# Patient Record
Sex: FEMALE | Race: BLACK OR AFRICAN AMERICAN | NOT HISPANIC OR LATINO | URBAN - METROPOLITAN AREA
[De-identification: names, ages, dates, MRNs, and addresses within clinical notes are randomized per-mention and may not be internally consistent; named-entity substitution may affect disease eponyms.]

---

## 2024-03-29 ENCOUNTER — EMERGENCY (EMERGENCY)
Facility: HOSPITAL | Age: 54
LOS: 1 days | Discharge: HOME | End: 2024-03-29
Attending: STUDENT IN AN ORGANIZED HEALTH CARE EDUCATION/TRAINING PROGRAM | Admitting: STUDENT IN AN ORGANIZED HEALTH CARE EDUCATION/TRAINING PROGRAM
Payer: COMMERCIAL

## 2024-03-29 VITALS
TEMPERATURE: 98 F | DIASTOLIC BLOOD PRESSURE: 91 MMHG | WEIGHT: 195.11 LBS | RESPIRATION RATE: 16 BRPM | OXYGEN SATURATION: 96 % | HEART RATE: 100 BPM | SYSTOLIC BLOOD PRESSURE: 149 MMHG

## 2024-03-29 DIAGNOSIS — M54.6 PAIN IN THORACIC SPINE: ICD-10-CM

## 2024-03-29 DIAGNOSIS — E11.9 TYPE 2 DIABETES MELLITUS WITHOUT COMPLICATIONS: ICD-10-CM

## 2024-03-29 DIAGNOSIS — M54.50 LOW BACK PAIN, UNSPECIFIED: ICD-10-CM

## 2024-03-29 DIAGNOSIS — M79.662 PAIN IN LEFT LOWER LEG: ICD-10-CM
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
## 2024-03-29 DIAGNOSIS — G89.11 ACUTE PAIN DUE TO TRAUMA: ICD-10-CM

## 2024-03-29 DIAGNOSIS — M79.661 PAIN IN RIGHT LOWER LEG: ICD-10-CM
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
## 2024-03-29 PROCEDURE — 72100 X-RAY EXAM L-S SPINE 2/3 VWS: CPT

## 2024-03-29 PROCEDURE — 99284 EMERGENCY DEPT VISIT MOD MDM: CPT | Mod: 25

## 2024-03-29 PROCEDURE — 73523 X-RAY EXAM HIPS BI 5/> VIEWS: CPT | Mod: 26

## 2024-03-29 PROCEDURE — 72070 X-RAY EXAM THORAC SPINE 2VWS: CPT | Mod: 26

## 2024-03-29 PROCEDURE — 72100 X-RAY EXAM L-S SPINE 2/3 VWS: CPT | Mod: 26

## 2024-03-29 PROCEDURE — 99284 EMERGENCY DEPT VISIT MOD MDM: CPT

## 2024-03-29 PROCEDURE — 73523 X-RAY EXAM HIPS BI 5/> VIEWS: CPT

## 2024-03-29 PROCEDURE — 72070 X-RAY EXAM THORAC SPINE 2VWS: CPT

## 2024-03-29 PROCEDURE — 82962 GLUCOSE BLOOD TEST: CPT

## 2024-03-29 NOTE — ED PROVIDER NOTE - PROGRESS NOTE DETAILS
MD Noah: XR appears without fx or dislocation. Explained to patient that X-rays that are performed in the emergency department are often not read by a radiologist within the duration of the emergency department visit, and that the final X-ray read may be available later today or tomorrow. I told the patient that the clinical decisions I make, if the X-ray is not read by a radiologist prior to the patient being discharged, are based on my own review of the X-ray which I document in the chart [as a "wet read."] My X-ray read is always supposed to be followed up by an attending radiologist read of the X-ray, and if there are new findings or a discrepancy between my read, and the attending radiology read, that the patient is supposed to be contacted by the hospital with these findings. However, I recommended to the patient that they should follow up the final X-ray read later today or tomorrow on [email or web address], and if they are unable to access their final X-ray read on [employer], that they can and should call the hospital to obtain the final read. Pt relays understanding, for a second time. Return precautions reviewed again and pt states understanding. appears in nad. will dc with pmd followup

## 2024-03-29 NOTE — ED ADULT NURSE NOTE - NSFALLRISKINTERV_ED_ALL_ED

## 2024-03-29 NOTE — ED PROVIDER NOTE - NSFOLLOWUPINSTRUCTIONS_ED_ALL_ED_FT
Please reach out to Marcella Jacobs ([facility] ED clinical referral coordinator) to assist you with your follow-up appointment with a spine surgeon.     Monday - Friday 11am-7pm  [phone]  [email or web address]     Call [phone] to make an appointment with a spine surgeon.       1. You were seen for back pain. A copy of any of your resulted labs, imaging, and findings have been provided to you. Make sure to view any test results that may not have yet resulted at the time of your discharge by creating a FollowMyHealth account at: [email or web address] to sign up for FollowMyHealth. Please review all of your lab, imaging, and all other results in their entirety with your primary care doctor.   2. Continue to take your home medications as prescribed.   3. Follow up with a spine surgeon and your primary care doctor within 48 hours to assess the symptoms you were seen for in the emergency department and to review all results from your visit. If you don't have a doctor, call [phone] to make an appointment.  4. Return immediately to the emergency department for new, persistent, or worsening symptoms or signs. Return immediately to the emergency department if you have chest pain, shortness of breath, loss of consciousness, difficulty controlling your bowel or bladder or difficulty moving or feeling your legs or arms.   5. For your for health, you should make healthy food choices and be physically active. Also, you should not smoke or use drugs, and you should not drink alcohol in excess. Please visit [email or web address] for resources and more information.

## 2024-03-29 NOTE — ED PROVIDER NOTE - PATIENT PORTAL LINK FT
You can access the FollowMyHealth Patient Portal offered by [facility] by registering at the following website: [email or web address]. By joining [employer]’s FollowMyHealth portal, you will also be able to view your health information using other applications (apps) compatible with our system.

## 2024-03-29 NOTE — ED ADULT NURSE NOTE - CHIEF COMPLAINT QUOTE
Pt presents to the ED for "I slipped on the escalator and I hurt my back." Pt denies head injury, no LOC. Pt endorses lower back pain. Pt ambulatory with steady gait.

## 2024-03-29 NOTE — ED PROVIDER NOTE - PHYSICAL EXAMINATION
GENERAL:  Awake, alert and in NAD, non-toxic appearing  ENMT: Airway patent  EYES: conjunctiva clear  CARDIAC: Regular rate, regular rhythm.  Heart sounds S1, S2, no S3, S4. No murmurs, rubs or gallops.  RESPIRATORY: Breath sounds clear and equal in bilateral anterior lung fields, no wheezes/ronchi/crackles/stridor; pt breathing and speaking comfortably with no increased WOB, no accessory mm. use, no intercostal retractions, no nasal flaring  GI: abdomen soft, non-distended, non-tender, no rebound or guarding in ruq/ruq/rlq/llq/epigastrium  SKIN: warm and dry, no rashes/ecchymosis/laceration/abrasion (including on lower legs)  PSYCH: awake, alert, calm and cooperative  EXTREMITIES: no ble edema, ROMERO  BLE: 5/5 motor strength bilat hip flexors, knee flexors and extensors, plantar and dorsiflexors, hallux flexors and extensors. sensation intact to light touch bilat L3-S1; 2+ DP pulses bilat; compartment soft, skin warm and dry   NEURO: gcs 15, gait wnl  spine: no midline c/t/l spinal ttp or stepoffs. +paraspinous thoracic and lumbar ttp with no laceration/ecchymosis/skin changes  head: ncat GENERAL:  Awake, alert and in NAD, non-toxic appearing  ENMT: Airway patent  EYES: conjunctiva clear, pupils 3 mm PERRL, EOMi  CARDIAC: Regular rate, regular rhythm.  Heart sounds S1, S2, no S3, S4. No murmurs, rubs or gallops.  RESPIRATORY: Breath sounds clear and equal in bilateral anterior lung fields, no wheezes/ronchi/crackles/stridor; pt breathing and speaking comfortably with no increased WOB, no accessory mm. use, no intercostal retractions, no nasal flaring  GI: abdomen soft, non-distended, non-tender, no rebound or guarding in ruq/ruq/rlq/llq/epigastrium  SKIN: warm and dry, no rashes/ecchymosis/laceration/abrasion (including on lower legs)  PSYCH: awake, alert, calm and cooperative  EXTREMITIES: no ble edema, ROMERO  BLE: 5/5 motor strength bilat hip flexors, knee flexors and extensors, plantar and dorsiflexors, hallux flexors and extensors. sensation intact to light touch bilat L3-S1; 2+ DP pulses bilat; compartment soft, skin warm and dry   NEURO: gcs 15, gait wnl  spine: no midline c/t/l spinal ttp or stepoffs. +paraspinous thoracic and lumbar ttp with no laceration/ecchymosis/skin changes  head: ncat

## 2024-03-29 NOTE — ED PROVIDER NOTE - NS ED ROS FT
Positive: Back pain, leg pain, fall, abdominal pain   negative: Fever, chills, cough, hemoptysis, chest pain, shortness of breath, nausea, vomiting, diarrhea, dysuria, hematuria, leg edema, rash, syncope,  focal numbness or weakness, bowel or bladder retention or incontinence

## 2024-03-29 NOTE — ED PROVIDER NOTE - OBJECTIVE STATEMENT
54-year-old female with past medical history of non-insulin-dependent diabetes mellitus, past surgical history of fallopian tubes surgery presents   With lower back pain and bilateral calf pain that started status post mechanical trip and fall down a couple of stairs up an escalator after an accidental misstep  a couple of hours ago.  patient denies loss of consciousness.  Patient states he is not 100% sure but does not recall hitting her head.  Patient denies blood thinner use.  Patient states her bilateral legs started hurting after she scraped her legs on the escalator when she fell.  Patient denies bowel or bladder retention or incontinence or focal numbness or weakness. pt reports some associated lower abdominal discomfort that started after the fall. 54-year-old female with past medical history of non-insulin-dependent diabetes mellitus, past surgical history of fallopian tubes surgery presents   With lower back pain and bilateral calf pain that started status post mechanical trip and fall down a couple of stairs up an escalator after an accidental misstep  a couple of hours ago.  patient denies loss of consciousness and denies any head trauma.  Patient denies blood thinner use.  Patient states her bilateral legs started hurting after she scraped her legs on the escalator when she fell.  Patient denies bowel or bladder retention or incontinence or focal numbness or weakness. pt reports some associated lower abdominal discomfort that started after the fall.

## 2024-03-29 NOTE — ED ADULT NURSE NOTE - OBJECTIVE STATEMENT
54 y.o female c/o lower back pain after slip and fall on escalator. Denies head strike, numbness/tingling, loss of bowel/bladder control.

## 2024-03-29 NOTE — ED PROVIDER NOTE - CLINICAL SUMMARY MEDICAL DECISION MAKING FREE TEXT BOX
54-year-old female with past medical history of non-insulin-dependent diabetes mellitus, past surgical history of fallopian tubes surgery presents   With lower back pain and bilateral calf pain that started status post mechanical trip and fall down a couple of stairs up an escalator after an accidental misstep  a couple of hours ago. On exam,  blood pressure 149/91, vital signs otherwise within normal limits (heart rate 100), no midline spinal tenderness, positive paraspinous tenderness.  Gait within normal limits.      DDx: Most likely sprain versus strain.  No point tenderness to palpation to suggest spinal fracture.  No red flags for cord compression.      Plan:  -  X-ray lumbar and thoracic spine and pelvis and hip  - Explained to patient that X-rays that are performed in the emergency department are often not read by a radiologist within the duration of the emergency department visit, and that the final X-ray read may be available later today or tomorrow. I told the patient that the clinical decisions I make, if the X-ray is not read by a radiologist prior to the patient being discharged, are based on my own review of the X-ray which I document in the chart [as a "wet read."] My X-ray read is always supposed to be followed up by an attending radiologist read of the X-ray, and if there are new findings or a discrepancy between my read, and the attending radiology read, that the patient is supposed to be contacted by the hospital with these findings. However, I recommended to the patient that they should follow up the final X-ray read later today or tomorrow on [email or web address], and if they are unable to access their final X-ray read on [employer], that they can and should call the hospital to obtain the final read. Pt relays understanding.   - Patient denies focal numbness or weakness or bowel or bladder retention or incontinence.  No concern for, or signs or symptoms of cord compression at this time.  Explained to patient that  they do not exhibit signs of cord compression at this time, however if they start to experience worsening back pain,  numbness or tingling or weakness in the arms or legs, or inability to control urinating or defecating, that these are signs of emergent cord compression and that they should return to the emergency department immediately.  Patient states understanding.